# Patient Record
Sex: MALE | Race: WHITE | NOT HISPANIC OR LATINO | URBAN - METROPOLITAN AREA
[De-identification: names, ages, dates, MRNs, and addresses within clinical notes are randomized per-mention and may not be internally consistent; named-entity substitution may affect disease eponyms.]

---

## 2019-11-13 ENCOUNTER — COMMUNICATION - HEALTHEAST (OUTPATIENT)
Dept: FAMILY MEDICINE | Facility: CLINIC | Age: 44
End: 2019-11-13

## 2019-11-13 ENCOUNTER — OFFICE VISIT - HEALTHEAST (OUTPATIENT)
Dept: FAMILY MEDICINE | Facility: CLINIC | Age: 44
End: 2019-11-13

## 2019-11-13 ENCOUNTER — COMMUNICATION - HEALTHEAST (OUTPATIENT)
Dept: TELEHEALTH | Facility: CLINIC | Age: 44
End: 2019-11-13

## 2019-11-13 DIAGNOSIS — R03.0 ELEVATED BLOOD PRESSURE READING: ICD-10-CM

## 2019-11-13 DIAGNOSIS — Z13.220 ENCOUNTER FOR SCREENING FOR LIPOID DISORDERS: ICD-10-CM

## 2019-11-13 DIAGNOSIS — F10.10 ALCOHOL ABUSE: ICD-10-CM

## 2019-11-13 DIAGNOSIS — Z23 NEED FOR VACCINATION: ICD-10-CM

## 2019-11-13 DIAGNOSIS — F51.01 PRIMARY INSOMNIA: ICD-10-CM

## 2019-11-13 DIAGNOSIS — Z00.00 ENCOUNTER FOR GENERAL ADULT MEDICAL EXAMINATION WITHOUT ABNORMAL FINDINGS: ICD-10-CM

## 2019-11-13 LAB
ALBUMIN SERPL-MCNC: 4.4 G/DL (ref 3.5–5)
ALP SERPL-CCNC: 87 U/L (ref 45–120)
ALT SERPL W P-5'-P-CCNC: 24 U/L (ref 0–45)
ANION GAP SERPL CALCULATED.3IONS-SCNC: 10 MMOL/L (ref 5–18)
AST SERPL W P-5'-P-CCNC: 23 U/L (ref 0–40)
BILIRUB SERPL-MCNC: 0.9 MG/DL (ref 0–1)
BUN SERPL-MCNC: 24 MG/DL (ref 8–22)
CALCIUM SERPL-MCNC: 9.5 MG/DL (ref 8.5–10.5)
CHLORIDE BLD-SCNC: 103 MMOL/L (ref 98–107)
CHOLEST SERPL-MCNC: 282 MG/DL
CO2 SERPL-SCNC: 28 MMOL/L (ref 22–31)
CREAT SERPL-MCNC: 1.11 MG/DL (ref 0.7–1.3)
ERYTHROCYTE [DISTWIDTH] IN BLOOD BY AUTOMATED COUNT: 11 % (ref 11–14.5)
FASTING STATUS PATIENT QL REPORTED: YES
GFR SERPL CREATININE-BSD FRML MDRD: >60 ML/MIN/1.73M2
GLUCOSE BLD-MCNC: 92 MG/DL (ref 70–125)
HCT VFR BLD AUTO: 49 % (ref 40–54)
HDLC SERPL-MCNC: 71 MG/DL
HGB BLD-MCNC: 16.4 G/DL (ref 14–18)
LDLC SERPL CALC-MCNC: 168 MG/DL
MCH RBC QN AUTO: 33.3 PG (ref 27–34)
MCHC RBC AUTO-ENTMCNC: 33.4 G/DL (ref 32–36)
MCV RBC AUTO: 100 FL (ref 80–100)
PLATELET # BLD AUTO: 311 THOU/UL (ref 140–440)
PMV BLD AUTO: 7.4 FL (ref 7–10)
POTASSIUM BLD-SCNC: 4.5 MMOL/L (ref 3.5–5)
PROT SERPL-MCNC: 7.7 G/DL (ref 6–8)
RBC # BLD AUTO: 4.91 MILL/UL (ref 4.4–6.2)
SODIUM SERPL-SCNC: 141 MMOL/L (ref 136–145)
TRIGL SERPL-MCNC: 217 MG/DL
WBC: 8 THOU/UL (ref 4–11)

## 2019-11-13 RX ORDER — CETIRIZINE HYDROCHLORIDE 10 MG/1
10 TABLET ORAL DAILY
Status: SHIPPED | COMMUNITY
Start: 2019-11-13

## 2019-11-13 ASSESSMENT — ANXIETY QUESTIONNAIRES
7. FEELING AFRAID AS IF SOMETHING AWFUL MIGHT HAPPEN: NOT AT ALL
3. WORRYING TOO MUCH ABOUT DIFFERENT THINGS: SEVERAL DAYS
5. BEING SO RESTLESS THAT IT IS HARD TO SIT STILL: NOT AT ALL
6. BECOMING EASILY ANNOYED OR IRRITABLE: SEVERAL DAYS
1. FEELING NERVOUS, ANXIOUS, OR ON EDGE: SEVERAL DAYS
2. NOT BEING ABLE TO STOP OR CONTROL WORRYING: NOT AT ALL
GAD7 TOTAL SCORE: 3
4. TROUBLE RELAXING: NOT AT ALL
IF YOU CHECKED OFF ANY PROBLEMS ON THIS QUESTIONNAIRE, HOW DIFFICULT HAVE THESE PROBLEMS MADE IT FOR YOU TO DO YOUR WORK, TAKE CARE OF THINGS AT HOME, OR GET ALONG WITH OTHER PEOPLE: NOT DIFFICULT AT ALL

## 2019-11-13 ASSESSMENT — PATIENT HEALTH QUESTIONNAIRE - PHQ9: SUM OF ALL RESPONSES TO PHQ QUESTIONS 1-9: 4

## 2019-11-13 ASSESSMENT — MIFFLIN-ST. JEOR: SCORE: 1753.66

## 2019-11-14 ENCOUNTER — COMMUNICATION - HEALTHEAST (OUTPATIENT)
Dept: FAMILY MEDICINE | Facility: CLINIC | Age: 44
End: 2019-11-14

## 2019-11-26 ENCOUNTER — OFFICE VISIT - HEALTHEAST (OUTPATIENT)
Dept: FAMILY MEDICINE | Facility: CLINIC | Age: 44
End: 2019-11-26

## 2019-11-26 DIAGNOSIS — F51.01 PRIMARY INSOMNIA: ICD-10-CM

## 2019-11-26 DIAGNOSIS — E78.5 HYPERLIPIDEMIA LDL GOAL <160: ICD-10-CM

## 2019-11-26 DIAGNOSIS — I10 BENIGN ESSENTIAL HYPERTENSION: ICD-10-CM

## 2019-11-26 RX ORDER — HYDROXYZINE PAMOATE 50 MG/1
50 CAPSULE ORAL 3 TIMES DAILY PRN
Qty: 90 CAPSULE | Refills: 5 | Status: SHIPPED | OUTPATIENT
Start: 2019-11-26

## 2019-12-10 LAB
ATRIAL RATE - MUSE: 82 BPM
DIASTOLIC BLOOD PRESSURE - MUSE: NORMAL
INTERPRETATION ECG - MUSE: NORMAL
P AXIS - MUSE: 49 DEGREES
PR INTERVAL - MUSE: 146 MS
QRS DURATION - MUSE: 98 MS
QT - MUSE: 368 MS
QTC - MUSE: 429 MS
R AXIS - MUSE: 42 DEGREES
SYSTOLIC BLOOD PRESSURE - MUSE: NORMAL
T AXIS - MUSE: 25 DEGREES
VENTRICULAR RATE- MUSE: 82 BPM

## 2021-05-26 ASSESSMENT — PATIENT HEALTH QUESTIONNAIRE - PHQ9: SUM OF ALL RESPONSES TO PHQ QUESTIONS 1-9: 4

## 2021-05-28 ASSESSMENT — ANXIETY QUESTIONNAIRES: GAD7 TOTAL SCORE: 3

## 2021-06-03 VITALS
WEIGHT: 195.38 LBS | DIASTOLIC BLOOD PRESSURE: 96 MMHG | BODY MASS INDEX: 28.94 KG/M2 | SYSTOLIC BLOOD PRESSURE: 146 MMHG | HEIGHT: 69 IN | OXYGEN SATURATION: 100 % | HEART RATE: 95 BPM

## 2021-06-03 VITALS
OXYGEN SATURATION: 97 % | BODY MASS INDEX: 30.45 KG/M2 | DIASTOLIC BLOOD PRESSURE: 86 MMHG | HEART RATE: 69 BPM | SYSTOLIC BLOOD PRESSURE: 136 MMHG | WEIGHT: 203.25 LBS

## 2021-06-03 NOTE — PROGRESS NOTES
Assessment:   1. Benign essential hypertension  Blood pressure is well controlled and meeting goal of <140/90 mm Hg per JNC-8 hypertension guidelines.  Continue current lifestyle changes.     2. Hyperlipidemia LDL goal <160  Calculated ASCVD risk of 2.3. Recommend that patient continue to exercise and consume heart healthy diet. Plan to recheck in 12 months.     3. Primary insomnia  Improved sleep with hydroxyzine and reduction of alcohol use.   - hydrOXYzine pamoate (VISTARIL) 50 MG capsule; Take 1 capsule (50 mg total) by mouth 3 (three) times a day as needed for itching.  Dispense: 90 capsule; Refill: 5     Plan:   Medication: none.  Dietary sodium restriction.  Regular aerobic exercise.  Check blood pressures daily and record.  Follow up: 3 months and as needed.     Subjective:   Patient here for follow-up of elevated blood pressure.  He is exercising and is adherent to a low-salt diet.  Blood pressure is well controlled at home. He has cut down on his drinking and he is sleeping better with hydroxyzine. Cardiac symptoms: none. Patient denies: chest pain, chest pressure/discomfort, claudication, dyspnea, exertional chest pressure/discomfort, fatigue, irregular heart beat, lower extremity edema, near-syncope, orthopnea, palpitations, paroxysmal nocturnal dyspnea, syncope and tachypnea. Cardiovascular risk factors: male gender. Use of agents associated with hypertension: none. History of target organ damage: none.    The following portions of the patient's history were reviewed and updated as appropriate: allergies, current medications, past family history, past medical history, past social history, past surgical history and problem list.    Review of Systems  A 12 point comprehensive review of systems was negative except as noted.      Objective:   /86   Pulse 69   Wt 203 lb 4 oz (92.2 kg)   SpO2 97%   BMI 30.45 kg/m    General appearance: alert, appears stated age and cooperative  Head: Normocephalic,  without obvious abnormality, atraumatic  Eyes: conjunctivae/corneas clear. PERRL, EOM's intact. Fundi benign.  Heart: regular rate and rhythm, S1, S2 normal, no murmur, click, rub or gallop  Neurologic: Grossly normal

## 2021-06-03 NOTE — PROGRESS NOTES
The patient was counseled and encouraged to consider modifying their diet and eating habits. He was provided with information on recommended healthy diet options.

## 2021-06-17 NOTE — PATIENT INSTRUCTIONS - HE
Patient Instructions by Fiona Saba FNP at 11/13/2019  7:50 AM     Author: Fiona Saba FNP Service: -- Author Type: Nurse Practitioner    Filed: 11/13/2019  8:22 AM Encounter Date: 11/13/2019 Status: Signed    : Fiona Saba FNP (Nurse Practitioner)           Preventing Skin Cancer     Use sunscreen of SPF 30 or greater. Apply liberally.   Relaxing in the sun may feel good. But it isnt good for your skin. In fact, the suns harmful rays are the major cause of skin cancer. This is a serious disease that can be life-threatening. People of all ages, races, and backgrounds are at risk.  Skin cancer is the most common cancer in the [place] But in most cases, it can be prevented.  Your role in prevention  You can act today to help prevent skin cancer. Start by avoiding the suns UV (ultraviolet) rays. And dont use tanning beds or lamps. They are no safer than the sun. Taking these steps can help keep you from getting skin cancer. It can also help prevent wrinkles and other aging effects caused by the sun. Make sure your children also follow these safeguards. Now is the time to start taking steps to prevent skin cancer.  When you are outdoors  Protect your skin when you go out during the day. Take safety steps whenever you go out to eat, run errands by car or on foot, or do any outdoor activity. There isnt just one easy way to protect your skin. Its best to follow all of these steps:    Wear tightly woven clothing that covers your skin. Put on a wide-brimmed hat to protect your face, ears, and scalp.    Watch the clock. Try to stay out of the sun between 10 a.m. and 4 p.m. That's when the sun's rays are strongest.    Head for the shade or create your own. Use an umbrella when sitting or strolling.    Know that the suns rays can reflect off sand, water, and snow. This can harm your skin. Take extra care when you are near reflective surfaces.    Keep in mind that even when the weather is hazy or  "cloudy, your skin can be exposed to strong UV rays.    Shield your skin with sunscreen. Also use sunscreen on your childrens skin. Keep babies younger than 6 months old out of the sun.  Tips for using sunscreen  To help prevent skin cancer, choose the right sunscreen and use it correctly. Try these tips:    Choose a sunscreen that has an SPF (sun protection factor) of at least 30. Also choose a sunscreen labeled \"broad spectrum. This will protect you from both UVA and UVB (ultraviolet A and B) rays.    If one brand irritates your skin, try another, such as one without fragrance.    Use a water-resistant sunscreen if you swim or sweat.    Use at least 1 ounce of sunscreen to cover exposed areas. This is enough to fill a shot glass. You might need to adjust the amount depending on your body size.    Put the sunscreen on dry skin about 15 minutes before going outdoors. This gives it time to soak in.    Reapply sunscreen every 2 hours. If youre active, do this more often.    Cover any sun-exposed skin, from your face to your feet. Dont forget your scalp, ears, and lips.    Know that while sunscreen helps protect you, it isnt enough. Sunscreens extend the length of time you can be outdoors before your skin starts to get red. But they don't give you total protection. Using sunscreen doesn't mean you can stay out in the sun for an unlimited time. Your skin cells are still being damaged. You should also wear protective clothing. And try to stay out of the sun as much as you can, especially from 10 a.m. to 4 p.m.  Date Last Reviewed: 7/1/2019 2000-2019 [employer]. [address] [place] [address], [place] [address]. All rights reserved. This information is not intended as a substitute for professional medical care. Always follow your healthcare professional's instructions.        Patient Education   Understanding USDA MyPlate  The USDA (US Department of Agriculture) has guidelines to help you make healthy food " choices. These are called MyPlate. MyPlate shows the food groups that make up healthy meals using the image of a place setting. Before you eat, think about the healthiest choices for what to put onto your plate or into your cup or bowl. To learn more about building a healthy plate, visit www.choosemyplate.gov.       The Food Groups    Fruits: Any fruit or 100% fruit juice counts as part of the Fruit Group. Fruits may be fresh, canned, frozen, or dried, and may be whole, cut-up, or pureed. Make half your plate fruits and vegetables.    Vegetables: Any vegetable or 100% vegetable juice counts as a member of the Vegetable Group. Vegetables may be fresh, frozen, canned, or dried. They can be served raw or cooked and may be whole, cut-up, or mashed. Make half your plate fruits and vegetables.     Grains: All foods made from grains are part of the Grains Group. These include wheat, rice, oats, cornmeal, and barley such as bread, pasta, oatmeal, cereal, tortillas, and grits. Grains should be no more than a quarter of your plate. At least half of your grains should be whole grains.    Protein: This group includes meat, poultry, seafood, beans and peas, eggs, processed soy products (like tofu), nuts (including nut butters), and seeds. Make protein choices no more than a quarter of your plate. Meat and poultry choices should be lean or low fat.    Dairy: All fluid milk products and foods made from milk that contain calcium, like yogurt and cheese are part of the Dairy Group. (Foods that have little calcium, such as cream, butter, and cream cheese, are not part of the group.) Most dairy choices should be low-fat or fat-free.    Oils: These are fats that are liquid at room temperature. They include canola, corn, olive, soybean, and sunflower oil. Foods that are mainly oil include mayonnaise, certain salad dressings, and soft margarines. You should have only 5 to 7 teaspoons of oils a day. You probably already get this much from  the food you eat.  Use [employer]it to Help Build Your Meals  The [employer] can help you plan and track your meals and activity. You can look up individual foods to see or compare their nutritional value. You can get guidelines for what and how much you should eat. You can compare your food choices. And you can assess personal physical activities and see ways you can improve. Go to www.[employer]gov/[employer]/.    2450-5916 [employer]. [address] [place] [address], [place] [address]. All rights reserved. This information is not intended as a substitute for professional medical care. Always follow your healthcare professional's instructions.

## 2021-06-19 NOTE — LETTER
Letter by Fiona Saba FNP at      Author: Fiona Saba FNP Service: -- Author Type: --    Filed:  Encounter Date: 11/14/2019 Status: Signed         Александр Pires  [address] [place] [id number]             November 14, 2019         Dear Tamara Pires,    Below are the results from your recent visit:    Resulted Orders   Comprehensive Metabolic Panel   Result Value Ref Range    Sodium 141 136 - 145 mmol/L    Potassium 4.5 3.5 - 5.0 mmol/L    Chloride 103 98 - 107 mmol/L    CO2 28 22 - 31 mmol/L    Anion Gap, Calculation 10 5 - 18 mmol/L    Glucose 92 70 - 125 mg/dL    BUN 24 (H) 8 - 22 mg/dL    Creatinine 1.11 0.70 - 1.30 mg/dL    GFR MDRD Af Amer >60 >60 mL/min/1.73m2    GFR MDRD Non Af Amer >60 >60 mL/min/1.73m2    Bilirubin, Total 0.9 0.0 - 1.0 mg/dL    Calcium 9.5 8.5 - 10.5 mg/dL    Protein, Total 7.7 6.0 - 8.0 g/dL    Albumin 4.4 3.5 - 5.0 g/dL    Alkaline Phosphatase 87 45 - 120 U/L    AST 23 0 - 40 U/L    ALT 24 0 - 45 U/L    Narrative    Fasting Glucose reference range is 70-99 mg/dL per  American Diabetes Association (ADA) guidelines.   HM2(CBC w/o Differential)   Result Value Ref Range    WBC 8.0 4.0 - 11.0 thou/uL    RBC 4.91 4.40 - 6.20 mill/uL    Hemoglobin 16.4 14.0 - 18.0 g/dL    Hematocrit 49.0 40.0 - 54.0 %     80 - 100 fL    MCH 33.3 27.0 - 34.0 pg    MCHC 33.4 32.0 - 36.0 g/dL    RDW 11.0 11.0 - 14.5 %    Platelets 311 140 - 440 thou/uL    MPV 7.4 7.0 - 10.0 fL   Lipid Cascade- FASTING  FUTURE   Result Value Ref Range    Cholesterol 282 (H) <=199 mg/dL    Triglycerides 217 (H) <=149 mg/dL    HDL Cholesterol 71 >=40 mg/dL    LDL Calculated 168 (H) <=129 mg/dL    Patient Fasting > 8hrs? Yes         Elevated total cholesterol, triglyceride and LDL. Cholesterol is a waxy substance that's found in the fats  (lipids) in your blood. While your body needs cholesterol to continue building healthy cells, having high  cholesterol can increase your risk of heart disease.  High cholesterol can be inherited, but it's often the  result of unhealthy lifestyle choices, and thus preventable and treatable. A healthy diet, regular exercise  and sometimes medication can go a long way toward reducing high cholesterol.  I would recommend  that you increase aerobic and cardiac exercise and consume heart healthy diet reducing both saturated  and trans-fats in your diet.  Plan to recheck cholesterol in 12 months.    Please call with questions or contact us using [employer].    Sincerely,        Electronically signed by GABY Zimmerman

## 2021-06-19 NOTE — LETTER
Letter by Fiona Saba FNP at      Author: Fiona Saba FNP Service: -- Author Type: --    Filed:  Encounter Date: 11/13/2019 Status: Signed         Александр Pires  [address] [place] [id number]             November 13, 2019         Dear  Pires,    Below are the results from your recent visit:    Resulted Orders   Comprehensive Metabolic Panel   Result Value Ref Range    Sodium 141 136 - 145 mmol/L    Potassium 4.5 3.5 - 5.0 mmol/L    Chloride 103 98 - 107 mmol/L    CO2 28 22 - 31 mmol/L    Anion Gap, Calculation 10 5 - 18 mmol/L    Glucose 92 70 - 125 mg/dL    BUN 24 (H) 8 - 22 mg/dL    Creatinine 1.11 0.70 - 1.30 mg/dL    GFR MDRD Af Amer >60 >60 mL/min/1.73m2    GFR MDRD Non Af Amer >60 >60 mL/min/1.73m2    Bilirubin, Total 0.9 0.0 - 1.0 mg/dL    Calcium 9.5 8.5 - 10.5 mg/dL    Protein, Total 7.7 6.0 - 8.0 g/dL    Albumin 4.4 3.5 - 5.0 g/dL    Alkaline Phosphatase 87 45 - 120 U/L    AST 23 0 - 40 U/L    ALT 24 0 - 45 U/L    Narrative    Fasting Glucose reference range is 70-99 mg/dL per  American Diabetes Association (ADA) guidelines.   HM2(CBC w/o Differential)   Result Value Ref Range    WBC 8.0 4.0 - 11.0 thou/uL    RBC 4.91 4.40 - 6.20 mill/uL    Hemoglobin 16.4 14.0 - 18.0 g/dL    Hematocrit 49.0 40.0 - 54.0 %     80 - 100 fL    MCH 33.3 27.0 - 34.0 pg    MCHC 33.4 32.0 - 36.0 g/dL    RDW 11.0 11.0 - 14.5 %    Platelets 311 140 - 440 thou/uL    MPV 7.4 7.0 - 10.0 fL   Lipid Cascade- FASTING  FUTURE   Result Value Ref Range    Cholesterol 282 (H) <=199 mg/dL    Triglycerides 217 (H) <=149 mg/dL    HDL Cholesterol 71 >=40 mg/dL    LDL Calculated 168 (H) <=129 mg/dL    Patient Fasting > 8hrs? Yes        Elevated total cholesterol, triglyceride and LDL. Cholesterol is a waxy substance that's found in the fats (lipids) in your blood. While your body needs cholesterol to continue building healthy cells, having high cholesterol can increase your risk of heart disease. High  cholesterol can be inherited, but it's often the result of unhealthy lifestyle choices, and thus preventable and treatable. A healthy diet, regular exercise and sometimes medication can go a long way toward reducing high cholesterol.  I would recommend that you increase aerobic and cardiac exercise and consume heart healthy diet reducing both saturated and trans-fats in your diet.  Plan to recheck cholesterol in 12 months.    Please call with questions or contact us using [employer].    Sincerely,        Electronically signed by GABY Zimmerman

## 2021-06-27 ENCOUNTER — HEALTH MAINTENANCE LETTER (OUTPATIENT)
Age: 46
End: 2021-06-27

## 2021-06-28 NOTE — PROGRESS NOTES
Progress Notes by Fiona Saba FNP at 11/13/2019  7:50 AM     Author: Fiona Saba FNP Service: -- Author Type: Nurse Practitioner    Filed: 11/13/2019 10:27 AM Encounter Date: 11/13/2019 Status: Signed    : Fiona Saba FNP (Nurse Practitioner)       MALE PREVENTATIVE EXAM    Assessment and Plan:   1. Encounter for general adult medical examination without abnormal findings  Healthy male exam  - Lipid Cascade- FASTING  FUTURE; Future  - Comprehensive Metabolic Panel  - HM2(CBC w/o Differential)  - Lipid Cascade- FASTING  FUTURE    2. Encounter for screening for lipoid disorders  - Lipid Cascade- FASTING  FUTURE    3. Elevated blood pressure reading  Discussed diagnosis of hypertension and answered every question.  Discussed need to start exercising and consume heart healthy diet.  Alcohol consumption.  Plan to get EKG today to rule out any heart arrhythmia.  Patient will follow-up in 2 weeks  - Electrocardiogram Perform and Read: Normal sinus rhythm.  Normal EKG.    4. Need for vaccination  Completing the vaccination after education.  - Tdap vaccine greater than or equal to 6yo IM    5. Primary insomnia  6.  Alcohol abuse  Discussed diagnosis and need to reduce or quit drinking.  Recommend use of hydroxyzine while tapering off alcohol and for sleep.  Patient will follow-up in 2 weeks  - hydrOXYzine pamoate (VISTARIL) 50 MG capsule; Take 1 capsule (50 mg total) by mouth 3 (three) times a day as needed for itching.  Dispense: 90 capsule; Refill: 0     Next follow up:  No follow-ups on file.    Immunization Review  Adult Imm Review: Due today, orders placed    I discussed the following with the patient:   Adult Healthy Living: Importance of regular exercise  Healthy nutrition  Getting adequate sleep  Stress management  Use of seat belts  Distracted driving  Helmets  Sporting equipment safety  Firearm safety  STI prevention  Contraception options  Supplement use  Herbal  medications/alternative medical therapies    I have had an Advance Directives discussion with the patient.    Subjective:   Chief Complaint: Александр Pires is an 44 y.o. male here for a preventative health visit.     HPI: Patient reported that he recently moved from [place] to [place] to be closer to family.  Patient reported that he served in the  for little over 10 years and has retired since then.  He is currently working for the "[employer].  Patient reported that he drinks about 7 shots of vodka every night.  He reported that he knows that this is not right and that is why he is here he is trying to start living a healthy life.  He wants to exercise and eating better and quit drinking as well.  He reported that he recently quit smoking 5 months ago.  He reported that he is not able to sleep more than 5 to 6 hours which is why some time he has to drink most nights.  He denies any chest pain, shortness of breath, syncope, fever and chills.  He denied any suicidal or homicidal ideations.    Healthy Habits  Are you taking a daily aspirin? No  Do you typically exercising at least 40 min, 3-4 times per week?  NO  Do you usually eat at least 4 servings of fruit and vegetables a day, include whole grains and fiber and avoid regularly eating high fat foods? NO  Have you had an eye exam in the past two years? NO  Do you see a dentist twice per year? NO  Do you have any concerns regarding sleep? No    Safety Screen  If you own firearms, are they secured in a locked gun cabinet or with trigger locks? The patient does not own any firearms  No data recorded    Review of Systems:  Please see above.  The rest of the review of systems are negative for all systems.     Cancer Screening     Patient has no health maintenance due at this time          Patient Care Team:  Fiona Saba FNP as PCP - General (Nurse Practitioner)        History     Not marked as reviewed during this visit.            Objective:   Vital  "Signs: There were no vitals taken for this visit.       PHYSICAL EXAM  BP (!) 146/96   Pulse 95   Ht 5' 8.5\" (1.74 m)   Wt 195 lb 6 oz (88.6 kg)   SpO2 100%   BMI 29.27 kg/m    General appearance: alert, appears stated age and cooperative  Head: Normocephalic, without obvious abnormality, atraumatic  Eyes: conjunctivae/corneas clear. PERRL, EOM's intact. Fundi benign.  Ears: normal TM's and external ear canals both ears  Throat: lips, mucosa, and tongue normal; teeth and gums normal  Neck: no adenopathy, no carotid bruit, no JVD, supple, symmetrical, trachea midline and thyroid not enlarged, symmetric, no tenderness/mass/nodules  Back: symmetric, no curvature. ROM normal. No CVA tenderness.  Lungs: clear to auscultation bilaterally  Chest wall: no tenderness  Heart: regular rate and rhythm, S1, S2 normal, no murmur, click, rub or gallop  Abdomen: soft, non-tender; bowel sounds normal; no masses,  no organomegaly  Male genitalia: normal  Rectal: normal tone, normal prostate, no masses or tenderness  Extremities: extremities normal, atraumatic, no cyanosis or edema  Pulses: 2+ and symmetric  Skin: Skin color, texture, turgor normal. No rashes or lesions  Lymph nodes: Cervical, supraclavicular, and axillary nodes normal.  Neurologic: Grossly normal    The ASCVD Risk score (Summit DC Jr., et al., 2013) failed to calculate for the following reasons:    Cannot find a previous HDL lab    Cannot find a previous total cholesterol lab         Medication List          Accurate as of November 13, 2019  8:57 AM. If you have any questions, ask your nurse or doctor.            START taking these medications    hydrOXYzine pamoate 50 MG capsule  Also known as:  VISTARIL  INSTRUCTIONS:  Take 1 capsule (50 mg total) by mouth 3 (three) times a day as needed for itching.  Started by:  GABY Zimmerman           CONTINUE taking these medications    cetirizine 10 MG tablet  Also known as:  ZyrTEC  INSTRUCTIONS:  Take 10 mg by " mouth daily.              Where to Get Your Medications      [id number] - [place], [place] - [address] AT [place] [address] [address], [facility] [address]    Phone:  [phone]     hydrOXYzine pamoate 50 MG capsule         Additional Screenings Completed Today:

## 2021-10-17 ENCOUNTER — HEALTH MAINTENANCE LETTER (OUTPATIENT)
Age: 46
End: 2021-10-17

## 2022-07-24 ENCOUNTER — HEALTH MAINTENANCE LETTER (OUTPATIENT)
Age: 47
End: 2022-07-24

## 2022-10-02 ENCOUNTER — HEALTH MAINTENANCE LETTER (OUTPATIENT)
Age: 47
End: 2022-10-02

## 2023-08-12 ENCOUNTER — HEALTH MAINTENANCE LETTER (OUTPATIENT)
Age: 48
End: 2023-08-12